# Patient Record
Sex: MALE | Race: WHITE | Employment: FULL TIME | URBAN - METROPOLITAN AREA
[De-identification: names, ages, dates, MRNs, and addresses within clinical notes are randomized per-mention and may not be internally consistent; named-entity substitution may affect disease eponyms.]

---

## 2020-02-21 ENCOUNTER — HOSPITAL ENCOUNTER (EMERGENCY)
Age: 50
Discharge: HOME | End: 2020-02-21
Attending: EMERGENCY MEDICINE
Payer: COMMERCIAL

## 2020-02-21 ENCOUNTER — APPOINTMENT (OUTPATIENT)
Dept: CT IMAGING | Age: 50
End: 2020-02-21
Payer: COMMERCIAL

## 2020-02-21 VITALS
OXYGEN SATURATION: 100 % | SYSTOLIC BLOOD PRESSURE: 126 MMHG | HEART RATE: 65 BPM | HEIGHT: 71 IN | TEMPERATURE: 97.8 F | WEIGHT: 224.21 LBS | BODY MASS INDEX: 31.39 KG/M2 | DIASTOLIC BLOOD PRESSURE: 87 MMHG | RESPIRATION RATE: 16 BRPM

## 2020-02-21 PROCEDURE — 99283 EMERGENCY DEPT VISIT LOW MDM: CPT

## 2020-02-21 PROCEDURE — 70450 CT HEAD/BRAIN W/O DYE: CPT

## 2020-02-21 PROCEDURE — 6370000000 HC RX 637 (ALT 250 FOR IP): Performed by: EMERGENCY MEDICINE

## 2020-02-21 RX ORDER — ACETAMINOPHEN 500 MG
1000 TABLET ORAL ONCE
Status: COMPLETED | OUTPATIENT
Start: 2020-02-21 | End: 2020-02-21

## 2020-02-21 RX ADMIN — ACETAMINOPHEN 1000 MG: 500 TABLET ORAL at 09:28

## 2020-02-21 ASSESSMENT — VISUAL ACUITY
OU: 20/20
OS: 20/25
OD: 20/25

## 2020-02-21 ASSESSMENT — PAIN SCALES - GENERAL
PAINLEVEL_OUTOF10: 6
PAINLEVEL_OUTOF10: 6

## 2020-02-21 ASSESSMENT — PAIN DESCRIPTION - DESCRIPTORS: DESCRIPTORS: ACHING

## 2020-02-21 ASSESSMENT — PAIN DESCRIPTION - LOCATION: LOCATION: HEAD

## 2020-02-21 ASSESSMENT — PAIN DESCRIPTION - PAIN TYPE: TYPE: ACUTE PAIN

## 2020-02-21 NOTE — ED PROVIDER NOTES
[address] [facility] Emergency Department      Pt Name: Norma Owen  MRN: [id number]  [place] 1970  Date of evaluation: 2/21/2020  Provider: Andrea Farmer MD  CHIEF COMPLAINT  Chief Complaint   Patient presents with    Head Injury     pt. hit his head on hitch of  truck on Wednesday at work, no LOC, still with a headache     HPI  Norma Owen is a 52 y.o. male who presents after a head injury. LOC absent. Injury happened 2 days ago. He was leaning over and hit the left side of his head against the hitch of a pickup truck. Since then, he has had persistent headache. There is soreness to the left side of his head. He tried some aspirin yesterday but it did not help. He has had slight nausea and decreased appetite. He has not vomited. He denies any visual loss but he does have light sensitivity. He denies any other injury. REVIEW OF SYSTEMS:  No weakness, no breathing difficulty, no neck pain, no vomiting Pertinent positives and negatives as per the HPI. All other review of systems reviewed and negative. Nursing notes reviewed. PAST MEDICAL HISTORY  Past Medical History:   Diagnosis Date    Cancer [facility])     prostate    Cancer [facility])     colon     SURGICAL HISTORY  Past Surgical History:   Procedure Laterality Date    COLON SURGERY      PROSTATECTOMY       MEDICATIONS:  No current facility-administered medications on file prior to encounter. No current outpatient medications on file prior to encounter. ALLERGIES  Patient has no known allergies. FAMILY HISTORY:  Not relevant  SOCIAL HISTORY:  Social History     Tobacco Use    Smoking status: Never Smoker    Smokeless tobacco: Never Used   Substance Use Topics    Alcohol use: Yes     Comment: occasionally    Drug use: Never     IMMUNIZATIONS:     There is no immunization history on file for this patient.     PHYSICAL EXAM  VITAL SIGNS:  Blood pressure 126/87, pulse 65, temperature 97.8 °F (36.6 °C), temperature source Oral, resp. rate 16, height 5' 11\" (1.803 m), weight 224 lb 3.3 oz (101.7 kg), SpO2 100 %. Constitutional:  52 y.o. male cooperative, generally well appearing  HENT:  Mucous membranes moist, no facial deformity, no sts of scalp, TMs clear  Eyes:   Conjunctiva clear, no icterus  Neck:  Supple, trachea midline, no JVD  Cardiovascular:  Regular, no rubs, no discernible murmur  Thorax & Lungs:  No accessory muscle usage, no crepitation, clear  Abdomen:  Soft, non distended, bowel sounds present, non tender  Back:  No deformity  Genitalia:  Deferred  Rectal:  Deferred  Extremities:  No cyanosis, distally NVI  Skin:  Warm, dry  Neurologic:  Alert & oriented, no slurred speech, PERRL, CN 2-12 grossly intact, coordination of extremities normal without deficit, gait is normal  Psychiatric:  Affect appropriate    DIAGNOSTIC RESULTS:    RADIOLOGY:    CT Head WO Contrast   Final Result   1. No acute intracranial abnormality. ED COURSE:  Mental status remained at baseline    PROCEDURES:  None    CRITICAL CARE:  None    CONSULTATIONS:  None    MEDICAL DECISION MAKING: Pearl Marvin is a 52 y.o. male who presented after a head injury. There is no radiographic evidence of intracranial injury on CT scan and the patient is neurologically at baseline. He is not exhibiting any signs of severe post-concussive syndrome that would warrant neurologic observation in a hospitalized setting. We advised the patient to return to the ER if he has increasing pain, vomiting, confusion, or other new concerns. Pearl Marvin was given appropriate discharge instructions. Referral to follow up provider. Differential Diagnosis:  Fracture, concussion, intracranial bleeding, other    FOLLOW UP:    your doctor    Schedule an appointment as soon as possible for a visit       [address] [place] [address]  [phone]  Go to   If symptoms worsen    FINAL IMPRESSION:    1.  Closed head